# Patient Record
Sex: FEMALE | Race: WHITE | NOT HISPANIC OR LATINO | Employment: UNEMPLOYED | URBAN - METROPOLITAN AREA
[De-identification: names, ages, dates, MRNs, and addresses within clinical notes are randomized per-mention and may not be internally consistent; named-entity substitution may affect disease eponyms.]

---

## 2023-09-06 DIAGNOSIS — R76.11 POSITIVE TB TEST: Primary | ICD-10-CM

## 2023-10-11 ENCOUNTER — OFFICE VISIT (OUTPATIENT)
Dept: INFECTIOUS DISEASES | Facility: CLINIC | Age: 68
End: 2023-10-11
Payer: MEDICARE

## 2023-10-11 VITALS
OXYGEN SATURATION: 100 % | TEMPERATURE: 99 F | WEIGHT: 229.88 LBS | RESPIRATION RATE: 20 BRPM | HEART RATE: 68 BPM | DIASTOLIC BLOOD PRESSURE: 68 MMHG | BODY MASS INDEX: 38.3 KG/M2 | HEIGHT: 65 IN | SYSTOLIC BLOOD PRESSURE: 117 MMHG

## 2023-10-11 DIAGNOSIS — L40.9 PSORIASIS: ICD-10-CM

## 2023-10-11 DIAGNOSIS — R76.12 POSITIVE QUANTIFERON-TB GOLD TEST: Primary | ICD-10-CM

## 2023-10-11 PROCEDURE — 1125F AMNT PAIN NOTED PAIN PRSNT: CPT | Mod: CPTII,,, | Performed by: NURSE PRACTITIONER

## 2023-10-11 PROCEDURE — 1159F PR MEDICATION LIST DOCUMENTED IN MEDICAL RECORD: ICD-10-PCS | Mod: CPTII,,, | Performed by: NURSE PRACTITIONER

## 2023-10-11 PROCEDURE — 3008F BODY MASS INDEX DOCD: CPT | Mod: CPTII,,, | Performed by: NURSE PRACTITIONER

## 2023-10-11 PROCEDURE — 3078F PR MOST RECENT DIASTOLIC BLOOD PRESSURE < 80 MM HG: ICD-10-PCS | Mod: CPTII,,, | Performed by: NURSE PRACTITIONER

## 2023-10-11 PROCEDURE — 4010F ACE/ARB THERAPY RXD/TAKEN: CPT | Mod: CPTII,,, | Performed by: NURSE PRACTITIONER

## 2023-10-11 PROCEDURE — 99205 PR OFFICE/OUTPT VISIT, NEW, LEVL V, 60-74 MIN: ICD-10-PCS | Mod: S$PBB,,, | Performed by: NURSE PRACTITIONER

## 2023-10-11 PROCEDURE — 1160F RVW MEDS BY RX/DR IN RCRD: CPT | Mod: CPTII,,, | Performed by: NURSE PRACTITIONER

## 2023-10-11 PROCEDURE — 1159F MED LIST DOCD IN RCRD: CPT | Mod: CPTII,,, | Performed by: NURSE PRACTITIONER

## 2023-10-11 PROCEDURE — 99215 OFFICE O/P EST HI 40 MIN: CPT | Mod: PBBFAC | Performed by: NURSE PRACTITIONER

## 2023-10-11 PROCEDURE — 99205 OFFICE O/P NEW HI 60 MIN: CPT | Mod: S$PBB,,, | Performed by: NURSE PRACTITIONER

## 2023-10-11 PROCEDURE — 1101F PR PT FALLS ASSESS DOC 0-1 FALLS W/OUT INJ PAST YR: ICD-10-PCS | Mod: CPTII,,, | Performed by: NURSE PRACTITIONER

## 2023-10-11 PROCEDURE — 3078F DIAST BP <80 MM HG: CPT | Mod: CPTII,,, | Performed by: NURSE PRACTITIONER

## 2023-10-11 PROCEDURE — 1101F PT FALLS ASSESS-DOCD LE1/YR: CPT | Mod: CPTII,,, | Performed by: NURSE PRACTITIONER

## 2023-10-11 PROCEDURE — 1125F PR PAIN SEVERITY QUANTIFIED, PAIN PRESENT: ICD-10-PCS | Mod: CPTII,,, | Performed by: NURSE PRACTITIONER

## 2023-10-11 PROCEDURE — 3074F PR MOST RECENT SYSTOLIC BLOOD PRESSURE < 130 MM HG: ICD-10-PCS | Mod: CPTII,,, | Performed by: NURSE PRACTITIONER

## 2023-10-11 PROCEDURE — 4010F PR ACE/ARB THEARPY RXD/TAKEN: ICD-10-PCS | Mod: CPTII,,, | Performed by: NURSE PRACTITIONER

## 2023-10-11 PROCEDURE — 3288F PR FALLS RISK ASSESSMENT DOCUMENTED: ICD-10-PCS | Mod: CPTII,,, | Performed by: NURSE PRACTITIONER

## 2023-10-11 PROCEDURE — 3288F FALL RISK ASSESSMENT DOCD: CPT | Mod: CPTII,,, | Performed by: NURSE PRACTITIONER

## 2023-10-11 PROCEDURE — 3074F SYST BP LT 130 MM HG: CPT | Mod: CPTII,,, | Performed by: NURSE PRACTITIONER

## 2023-10-11 PROCEDURE — 3008F PR BODY MASS INDEX (BMI) DOCUMENTED: ICD-10-PCS | Mod: CPTII,,, | Performed by: NURSE PRACTITIONER

## 2023-10-11 PROCEDURE — 1160F PR REVIEW ALL MEDS BY PRESCRIBER/CLIN PHARMACIST DOCUMENTED: ICD-10-PCS | Mod: CPTII,,, | Performed by: NURSE PRACTITIONER

## 2023-10-11 RX ORDER — ASPIRIN 81 MG/1
81 TABLET ORAL DAILY
COMMUNITY

## 2023-10-11 RX ORDER — PREGABALIN 150 MG/1
150 CAPSULE ORAL 2 TIMES DAILY
COMMUNITY
Start: 2023-08-21

## 2023-10-11 RX ORDER — METOPROLOL SUCCINATE 50 MG/1
50 TABLET, EXTENDED RELEASE ORAL 2 TIMES DAILY
COMMUNITY
Start: 2023-08-10

## 2023-10-11 RX ORDER — INSULIN GLARGINE 100 [IU]/ML
INJECTION, SOLUTION SUBCUTANEOUS
COMMUNITY
Start: 2023-07-27

## 2023-10-11 RX ORDER — LEVOTHYROXINE SODIUM 150 UG/1
150 TABLET ORAL
COMMUNITY
Start: 2023-10-01

## 2023-10-11 RX ORDER — LORATADINE 10 MG/1
10 TABLET ORAL DAILY
COMMUNITY

## 2023-10-11 RX ORDER — VALSARTAN 80 MG/1
80 TABLET ORAL
COMMUNITY
Start: 2023-08-10

## 2023-10-11 RX ORDER — ROSUVASTATIN CALCIUM 40 MG/1
40 TABLET, COATED ORAL
COMMUNITY
Start: 2023-08-10

## 2023-10-11 RX ORDER — CALCIUM CITRATE/VITAMIN D3 200MG-6.25
TABLET ORAL
COMMUNITY
Start: 2023-10-04

## 2023-10-11 RX ORDER — FENOFIBRATE 54 MG/1
54 TABLET ORAL
COMMUNITY
Start: 2023-08-15

## 2023-10-11 RX ORDER — SEMAGLUTIDE 0.68 MG/ML
INJECTION, SOLUTION SUBCUTANEOUS
COMMUNITY
Start: 2023-08-15

## 2023-10-11 RX ORDER — AMLODIPINE BESYLATE 5 MG/1
5 TABLET ORAL
COMMUNITY
Start: 2023-09-26

## 2023-10-11 RX ORDER — OMEPRAZOLE 40 MG/1
40 CAPSULE, DELAYED RELEASE ORAL 2 TIMES DAILY
COMMUNITY
Start: 2023-09-05

## 2023-10-11 RX ORDER — TIZANIDINE 4 MG/1
2-4 TABLET ORAL
COMMUNITY
Start: 2023-09-11

## 2023-10-11 RX ORDER — ALPRAZOLAM 1 MG/1
1 TABLET ORAL DAILY PRN
COMMUNITY
Start: 2023-09-26

## 2023-10-11 RX ORDER — FLUTICASONE FUROATE, UMECLIDINIUM BROMIDE AND VILANTEROL TRIFENATATE 100; 62.5; 25 UG/1; UG/1; UG/1
1 POWDER RESPIRATORY (INHALATION)
COMMUNITY
Start: 2023-08-24

## 2023-10-11 RX ORDER — PEN NEEDLE, DIABETIC 31 GX3/16"
NEEDLE, DISPOSABLE MISCELLANEOUS
COMMUNITY
Start: 2023-08-14

## 2023-10-11 RX ORDER — FUROSEMIDE 40 MG/1
20 TABLET ORAL
COMMUNITY
Start: 2023-08-10

## 2023-10-11 RX ORDER — DULOXETIN HYDROCHLORIDE 60 MG/1
60 CAPSULE, DELAYED RELEASE ORAL
COMMUNITY
Start: 2023-09-26

## 2023-10-11 RX ORDER — ALBUTEROL SULFATE 0.83 MG/ML
SOLUTION RESPIRATORY (INHALATION)
COMMUNITY
Start: 2023-05-24

## 2023-10-11 NOTE — PROGRESS NOTES
Patient ID: Mendel Fletcher 68 y.o.     Chief Complaint:   Chief Complaint   Patient presents with    New referral     TB        HPI:    Mendel is a 67 y/o WF referred here by Dr. Darryl Stokes for a positive quant gold test done 8/25/23.  He is evaluating her for a diagnosis of psoriasis. Pt was previously seen by Dr. Navarro and Dr. Sumner. Skin lesions have not resolved with steroids so Dr. Stokes is looking into Otezla.  Labs were done 8/25/23 quant gold was positive.   8/23/23 Hep B core ab neg, Hep B s ag neg, Hep B s ab neg, Hep C ab neg. Pt will need updated labs and a CXR today. She has [employer] Gold so labs and chest x ray will need to be done at another facility to prevent out of pocket cost.  Pt denies fever, headache, chills, night sweats, visual problems, N/V/D, chest pain or abd pain.  She complains of a dry cough that is always present due to COPD. Pt is on home O2. She also complains of psoriasis to bilateral elbows, arms, and back.  PCP is Dr. Jodee Alvarez.  Co-morbidities include HTN, COPD, DM2, hypothyroidism, depression, and psoriasis. She is due for several vaccines, but is not interested in vaccines at this time. BMI 38.           Past Medical History:   Diagnosis Date    Anxiety     COPD (chronic obstructive pulmonary disease)     Diabetes mellitus, type 2     Hyperlipidemia     Hypertension         Past Surgical History:   Procedure Laterality Date    BACK SURGERY      GALLBLADDER SURGERY      HYSTERECTOMY          Social History     Socioeconomic History    Marital status:    Tobacco Use    Smoking status: Former     Current packs/day: 0.00     Types: Cigarettes     Quit date: 1/1/2020     Years since quitting: 3.7    Smokeless tobacco: Never   Substance and Sexual Activity    Alcohol use: Not Currently    Drug use: Never    Sexual activity: Not Currently     Partners: Male        Family History   Problem Relation Age of Onset    Diabetes Mother     Hypertension Mother     Cancer  "Mother     Heart disease Father     Diabetes Father     Hyperlipidemia Brother     Hypertension Brother     Diabetes Brother         Review of patient's allergies indicates:   Allergen Reactions    Augmentin [amoxicillin-pot clavulanate] Itching        Immunization History   Administered Date(s) Administered    COVID-19, MRNA, LN-S, PF (Pfizer) (Purple Cap) 02/25/2021, 03/19/2021, 12/06/2021    Influenza - Quadrivalent - MDCK - PF 09/06/2017, 10/12/2020    Influenza - Quadrivalent - PF *Preferred* (6 months and older) 09/20/2016, 02/19/2017, 09/17/2018, 10/10/2022    Pneumococcal Polysaccharide - 23 Valent 08/31/2016, 08/22/2019        Review of Systems   Constitutional: Negative.    HENT: Negative.     Eyes: Negative.    Respiratory: Negative.     Cardiovascular: Negative.    Gastrointestinal: Negative.    Genitourinary: Negative.    Musculoskeletal: Negative.    Skin: Negative.    Neurological: Negative.    Endo/Heme/Allergies: Negative.    Psychiatric/Behavioral: Negative.     All other systems reviewed and are negative.         Objective:      /68   Pulse 68   Temp 98.5 °F (36.9 °C)   Resp 20   Ht 5' 5" (1.651 m)   Wt 104.3 kg (229 lb 14.4 oz)   LMP  (LMP Unknown)   SpO2 100% Comment: Room air  BMI 38.26 kg/m²      Physical Exam  Vitals reviewed.   Constitutional:       General: She is not in acute distress.     Appearance: Normal appearance. She is obese.   HENT:      Head: Normocephalic.      Right Ear: External ear normal.      Left Ear: External ear normal.      Nose: Nose normal.      Mouth/Throat:      Mouth: Mucous membranes are moist.      Pharynx: Oropharynx is clear.      Comments: Upper dentures noted   Eyes:      General: No scleral icterus.     Extraocular Movements: Extraocular movements intact.      Conjunctiva/sclera: Conjunctivae normal.      Pupils: Pupils are equal, round, and reactive to light.   Cardiovascular:      Rate and Rhythm: Normal rate and regular rhythm.      Pulses: " Normal pulses.      Heart sounds: Normal heart sounds.   Pulmonary:      Effort: Pulmonary effort is normal. No respiratory distress.      Breath sounds: Normal breath sounds.   Abdominal:      General: Bowel sounds are normal. There is no distension.      Palpations: Abdomen is soft. There is no mass.      Tenderness: There is no abdominal tenderness. There is no right CVA tenderness or left CVA tenderness.      Hernia: No hernia is present.   Musculoskeletal:         General: No tenderness or signs of injury. Normal range of motion.      Cervical back: Normal range of motion and neck supple.      Right lower leg: No edema.      Left lower leg: No edema.   Lymphadenopathy:      Cervical: No cervical adenopathy.   Skin:     General: Skin is warm and dry.      Capillary Refill: Capillary refill takes less than 2 seconds.      Findings: Rash present. No erythema or lesion. Rash is scaling and urticarial.      Comments: Pt has several patchy areas of psoriasis noted to back and arms.     Neurological:      General: No focal deficit present.      Mental Status: She is alert and oriented to person, place, and time. Mental status is at baseline.   Psychiatric:         Mood and Affect: Mood normal.         Behavior: Behavior normal.         Thought Content: Thought content normal.         Judgment: Judgment normal.          Medications:     Current Outpatient Medications   Medication Instructions    albuterol (PROVENTIL) 2.5 mg /3 mL (0.083 %) nebulizer solution Nebulization    ALPRAZolam (XANAX) 1 mg, Oral, Daily PRN    amLODIPine (NORVASC) 5 mg, Oral    aspirin (ECOTRIN) 81 mg, Oral, Daily    DULoxetine (CYMBALTA) 60 mg, Oral    fenofibrate (TRICOR) 54 mg, Oral    furosemide (LASIX) 20 mg, Oral    LANTUS SOLOSTAR U-100 INSULIN glargine 100 units/mL SubQ pen Subcutaneous    levothyroxine (SYNTHROID) 150 mcg, Oral    loratadine (CLARITIN) 10 mg, Oral, Daily    metoprolol succinate (TOPROL-XL) 50 mg, Oral, 2 times daily     "omeprazole (PRILOSEC) 40 mg, Oral, 2 times daily    OZEMPIC 0.25 mg or 0.5 mg (2 mg/3 mL) pen injector Subcutaneous    pregabalin (LYRICA) 150 mg, Oral, 2 times daily    rosuvastatin (CRESTOR) 40 mg, Oral    SURE COMFORT PEN NEEDLE 31 gauge x 5/16" Ndle use ONE pen needle with insulin ONCE DAILY    tiZANidine (ZANAFLEX) 2-4 mg, Oral    TRELEGY ELLIPTA 100-62.5-25 mcg DsDv 1 puff, Inhalation    TRUE METRIX GLUCOSE TEST STRIP Strp SMARTSIG:Via Meter    valsartan (DIOVAN) 80 mg, Oral       Assessment:       Problem List Items Addressed This Visit    None  Visit Diagnoses       Positive QuantiFERON-TB Gold test    -  Primary    Relevant Orders    X-Ray Chest PA And Lateral    Comprehensive Metabolic Panel    CBC Auto Differential    Quantiferon Gold TB    Hepatitis A antibody, IgG    CBC Auto Differential    Comprehensive Metabolic Panel    BMI 38.0-38.9,adult        Psoriasis                   Plan:      Positive QuantiFERON-TB Gold test  -     Ambulatory referral/consult to Infectious Disease  -     X-Ray Chest PA And Lateral; Future; Expected date: 10/11/2023  -     Cancel: CBC Auto Differential; Future; Expected date: 10/11/2023  -     Cancel: Comprehensive Metabolic Panel; Future; Expected date: 10/11/2023  -     Cancel: Quantiferon Gold TB; Future; Expected date: 10/11/2023  -     Comprehensive Metabolic Panel; Future; Expected date: 10/11/2023  -     CBC Auto Differential; Future; Expected date: 10/11/2023  -     Quantiferon Gold TB; Future; Expected date: 10/11/2023  -     Hepatitis A antibody, IgG; Future; Expected date: 10/11/2023  -     CBC Auto Differential; Future; Expected date: 11/10/2023  -     Comprehensive Metabolic Panel; Future; Expected date: 11/10/2023  Pt will need updated labs and CXR. If CXR is negative, I will proceed with latent TB treatment. Discussed extensively the difference between active and latent TB as well as treatments. Drug drug interaction check done on [facility].  Rifampin and " "Rifapentine are not options as they interact with multiple different medications so patient will need Isoniazid 300 mg 1 po q day and Pyridoxine 50 mg 1 po q day x 6 months. Pt will need to avoid alcohol and will need to discontinue Xanax while taking Isoniazid.  Glucose will need to be monitored more frequently as Isoniazid can decrease diabetic meds.    I will contact patient once all labs and CXR have resulted to start meds.   Vaccines recommended. Pt is not interested   RTC 1 month with Leeann for a telemed visit   Labs today and 1 week before next visit.     BMI 38.0-38.9,adult  Increase exercise activity to 30 minutes 3-5 x per week.  Avoid sugar sodas, simple sugars, sweets, excessive rice, pasta, potatoes or bread.  Choose "brown" options when available; practice portion control.  Limit fast foods and fried foods.  Encouraged to eat more portions of fresh fruits and vegetables with lean meats daily (chicken, fish, etc).  Consider permanent healthy lifestyle changes.   Counseled on diet and answered all questions     Psoriasis  Keep all appts with Dr. Stokes        60 minutes of total time spent on the encounter, which includes face to face time and non-face to face time preparing to see the patient (eg, review of tests), Obtaining and/or reviewing separately obtained history, Documenting clinical information in the electronic or other health record, Independently interpreting results (not separately reported) and communicating results to the patient/family/caregiver, or Care coordination (not separately reported).          "

## 2023-10-16 ENCOUNTER — TELEPHONE (OUTPATIENT)
Dept: INFECTIOUS DISEASES | Facility: CLINIC | Age: 68
End: 2023-10-16
Payer: MEDICARE

## 2023-10-16 NOTE — TELEPHONE ENCOUNTER
Reviewed labs, but need quant gold and chest x ray. Has chest x ray been done? I will need to see the results.    Thank you,   ORALIA Buckley-BC

## 2023-10-16 NOTE — TELEPHONE ENCOUNTER
----- Message from Joya Lopez sent at 10/16/2023  2:13 PM CDT -----  Regarding: results  Pt of Leeann  Pt requesting a call back concerning results bc she still hadn't received a call back about blood work results  [phone]

## 2023-10-16 NOTE — TELEPHONE ENCOUNTER
FYI:  Pt reported she had labs done at [facility] on 10/12/23. Called [facility] some labs are resulted (they will fax) the TB results which is still pending (takes (3-5 days). Pt informed will call her back with results once finalized.     Results scanned into chart

## 2023-10-23 ENCOUNTER — TELEPHONE (OUTPATIENT)
Dept: INFECTIOUS DISEASES | Facility: CLINIC | Age: 68
End: 2023-10-23
Payer: MEDICARE

## 2023-10-23 NOTE — TELEPHONE ENCOUNTER
Called [facility] (medical records) to check on status of TB test (was pending last week) she will fax what they have.    Informed pt still waiting on [facility] to fax final results, she verbalized understanding.

## 2023-10-23 NOTE — TELEPHONE ENCOUNTER
----- Message from Lucille Quinones sent at 10/23/2023 10:12 AM CDT -----  Regarding: Results  JONATHAN AVALOS         Pt is requesting a call back with lab results.   Pt # [phone]

## 2023-10-24 NOTE — TELEPHONE ENCOUNTER
Reviewed labs which were done at [facility] both times. Quant gold positive 8/25/23 and negative 10/15/23. It appears the first reading was a false positive.  Pt was referred by Dr. Darryl Stokes who would like to start the patient on Otezla for psoriasis so I will review with Dr. Garcias to ensure patient does not need treatment.Pt notified of findings and plan. I will reach out to her tomorrow.

## 2023-10-25 ENCOUNTER — TELEPHONE (OUTPATIENT)
Dept: INFECTIOUS DISEASES | Facility: CLINIC | Age: 68
End: 2023-10-25
Payer: MEDICARE

## 2023-10-25 NOTE — TELEPHONE ENCOUNTER
Reviewed labs which were done at [facility] on two different occasions.  Quant gold was positive 8/25/23 and negative 10/15/23. Case discussed with Dr. Simón Garcias who states previous reading was a false positive and patient does not require treatment.  She will need annual screening.  Please send my note to Dr. Darryl Stokes so patient can start her medications for psoriasis.

## 2023-11-06 ENCOUNTER — TELEPHONE (OUTPATIENT)
Dept: INFECTIOUS DISEASES | Facility: CLINIC | Age: 68
End: 2023-11-06
Payer: MEDICARE

## 2023-11-06 NOTE — TELEPHONE ENCOUNTER
----- Message from Yessica Samuel sent at 11/6/2023  7:26 AM CST -----  Patient of Leeann    Patient was scheduled for virtual visit on 11/07/24.     Spoke with patient this am and patient requested to cx appointment with no reschedule    Patient was told by provider ( Leeann) treatment is no longer needed.     Please advise     [phone]    Thanks

## 2025-03-20 ENCOUNTER — TELEPHONE (OUTPATIENT)
Dept: INFECTIOUS DISEASES | Facility: CLINIC | Age: 70
End: 2025-03-20
Payer: MEDICARE

## 2025-03-20 DIAGNOSIS — R76.11 POSITIVE TB TEST: Primary | ICD-10-CM

## 2025-03-20 NOTE — TELEPHONE ENCOUNTER
Patient informed of results and providers recommendations. All questions answered. Patient verbalized understanding.   Pt stated she had labs done with PCP also and will try and get those results to us but she's unsure of what labs were done. She asked that I fax the orders to [facility] and she would have it done on Monday.

## 2025-03-20 NOTE — TELEPHONE ENCOUNTER
Jodee,     Can you call Ms. Oglesby and let her know that I would like for her to repeat the quantiferon gold test prior to appt? She has [employer] so she will have to get it elsewhere       Reviewed labs   8/30/23 quant gold positive   10/15/23 quant gold negative   11/5/24 quant gold positive     Thank you,   Leeann Brooks, FNP-BC